# Patient Record
(demographics unavailable — no encounter records)

---

## 2024-11-27 NOTE — ASSESSMENT
[FreeTextEntry1] : Impression: Idiopathic toe walking.  Family has been made aware as to the natural history of the above that being benign with improvement expected as he continues to mature over the next 2-3 years time.  They have been made aware at this time there is no indication for active intervention in the form of therapy splinting/bracing or orthotic use.  Return on a as needed basis

## 2024-11-27 NOTE — HISTORY OF PRESENT ILLNESS
[FreeTextEntry1] : This 4+ 1-year-old healthy child with normal birth history and development is seen today for evaluation of his gait.  The family is concerned because of toe walking.  The child however does well and is capable of walking on his heels according to the parents.  He has not had any difficulty with his functional activities and has no difficulty keeping up with his peers on the playground.  On occasion he will complain of discomfort in his knees.  When he does have discomfort it is for very brief periods of time.  Never associated with any obvious swelling rash or persistent limp.  He had been prescribed orthotics for his toe walking in the past.

## 2024-11-27 NOTE — CONSULT LETTER
[Dear  ___] : Dear  [unfilled], [Consult Letter:] : I had the pleasure of evaluating your patient, [unfilled]. [Please see my note below.] : Please see my note below. [Consult Closing:] : Thank you very much for allowing me to participate in the care of this patient.  If you have any questions, please do not hesitate to contact me. [Sincerely,] : Sincerely, [FreeTextEntry3] : Dr Anders Carrillo JR.